# Patient Record
Sex: MALE | Race: ASIAN | NOT HISPANIC OR LATINO | URBAN - METROPOLITAN AREA
[De-identification: names, ages, dates, MRNs, and addresses within clinical notes are randomized per-mention and may not be internally consistent; named-entity substitution may affect disease eponyms.]

---
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
## 2022-01-02 ENCOUNTER — EMERGENCY (EMERGENCY)
Facility: HOSPITAL | Age: 22
LOS: 1 days | Discharge: HOME | End: 2022-01-02
Attending: PERSONAL EMERGENCY RESPONSE ATTENDANT
Payer: COMMERCIAL

## 2022-01-02 VITALS
HEART RATE: 88 BPM | SYSTOLIC BLOOD PRESSURE: 110 MMHG | TEMPERATURE: 98 F | RESPIRATION RATE: 18 BRPM | OXYGEN SATURATION: 98 % | DIASTOLIC BLOOD PRESSURE: 77 MMHG

## 2022-01-02 VITALS
RESPIRATION RATE: 19 BRPM | HEIGHT: 70 IN | OXYGEN SATURATION: 95 % | DIASTOLIC BLOOD PRESSURE: 73 MMHG | TEMPERATURE: 98 F | HEART RATE: 89 BPM | SYSTOLIC BLOOD PRESSURE: 106 MMHG | WEIGHT: 134.92 LBS

## 2022-01-02 PROCEDURE — 90471 IMMUNIZATION ADMIN: CPT

## 2022-01-02 PROCEDURE — 90675 RABIES VACCINE IM: CPT

## 2022-01-02 PROCEDURE — 99281 EMR DPT VST MAYX REQ PHY/QHP: CPT | Mod: 25

## 2022-01-02 PROCEDURE — 99283 EMERGENCY DEPT VISIT LOW MDM: CPT

## 2022-01-02 PROCEDURE — 99053 MED SERV 10PM-8AM 24 HR FAC: CPT

## 2022-01-02 RX ORDER — RABIES VACC, HUMAN DIPLOID/PF 2.5 UNIT
1 VIAL (EA) INTRAMUSCULAR ONCE
Refills: 0 | Status: COMPLETED | OUTPATIENT
Start: 2022-01-02 | End: 2022-01-02

## 2022-01-02 RX ADMIN — Medication 1 MILLILITER(S): at 09:11

## 2022-01-02 NOTE — ED PROVIDER NOTE - CLINICAL SUMMARY MEDICAL DECISION MAKING FREE TEXT BOX
Joseph Frankel PGY3: 22 yo sp dog bite here for 4th rabies shot. VSS. Patient looks well and is non toxic appearing. PE as above. No concern for infection. Will give 4th shot. Discussed plan and return precautions with patient who understands and agrees. All questions answered.

## 2022-01-02 NOTE — ED PROVIDER NOTE - ATTENDING CONTRIBUTION TO CARE
Attending MD Swanson.  Agree with above.  Pt's R lateral thigh wound clean, dry, no surrounding erythema/drainage/induration.  Pt well appearing and has presented at appropriate time for 4th rabies vaccine.  Stable for discharge home.  Counseled re: need to return in 2 wks for final COVID vaccine.

## 2022-01-02 NOTE — ED ADULT NURSE NOTE - OBJECTIVE STATEMENT
Pt A&OX4, NAD noted. Presents to ED requesting fourth dose of rabies vaccine s/p dog bite approximately 2 weeks ago. Pt offers no complaints at this time. Respirations even and unlabored. Abdomen soft, nd/nt. Skin warm, dry, color normal for race. Plan of care discussed. ED workup in progress.

## 2022-01-02 NOTE — ED PROVIDER NOTE - OBJECTIVE STATEMENT
22 yo M presenting for 4th rabies shot. Patient was bit in leg by dog now sp ab. Patient has no complaints at this time.

## 2022-01-02 NOTE — ED PROVIDER NOTE - NSFOLLOWUPINSTRUCTIONS_ED_ALL_ED_FT
Follow up with your primary care physician.     Return to the Emergency Department for worsening or persistent symptoms, and/or ANY NEW OR CONCERNING SYMPTOMS. If you have issues obtaining follow up, please call: [phone] to obtain a doctor or specialist who takes your insurance in your area. Return in 14 days (1/16/22) for your 5th and last shot.    Follow up with your primary care physician as discussed.     Return to the Emergency Department for worsening or persistent symptoms, and/or ANY NEW OR CONCERNING SYMPTOMS. If you have issues obtaining follow up, please call: [phone] to obtain a doctor or specialist who takes your insurance in your area. Follow up with your primary care physician as discussed.     Return to the Emergency Department for worsening or persistent symptoms, and/or ANY NEW OR CONCERNING SYMPTOMS. If you have issues obtaining follow up, please call: [phone] to obtain a doctor or specialist who takes your insurance in your area.

## 2022-01-02 NOTE — ED PROVIDER NOTE - PATIENT PORTAL LINK FT
You can access the FollowMyHealth Patient Portal offered by [facility] by registering at the following website: [email or web address]. By joining [employer]’s FollowMyHealth portal, you will also be able to view your health information using other applications (apps) compatible with our system.

## 2022-01-02 NOTE — ED PROVIDER NOTE - PHYSICAL EXAMINATION
Gen: Alert and oriented. Lying comfortably in bed. Answering questions appropriately  CV: Extremities wwp  Resp: No increased work of breathing  MSK: No open wounds, no bruising, no LE edema  Neuro: A&Ox4, following commands, moving all four extremities spontaneously  Psych: appropriate mood

## 2023-02-02 NOTE — ED PROVIDER NOTE - OBJECTIVE STATEMENT
diabetes
Uncontrolled Type 2 DM w/ hyperglycemia
21M presenting for dose #2 of rabies vaccination series. The patient was bitten by a dog prompting ED visit. Dog bite to right lateral thigh. Taking oral antibiotic. No fevers, discharge or spreading redness around the bite.